# Patient Record
Sex: FEMALE | Race: WHITE
[De-identification: names, ages, dates, MRNs, and addresses within clinical notes are randomized per-mention and may not be internally consistent; named-entity substitution may affect disease eponyms.]

---

## 2018-06-28 ENCOUNTER — HOSPITAL ENCOUNTER (EMERGENCY)
Dept: HOSPITAL 76 - ED | Age: 58
Discharge: HOME | End: 2018-06-28
Payer: COMMERCIAL

## 2018-06-28 VITALS — DIASTOLIC BLOOD PRESSURE: 89 MMHG | SYSTOLIC BLOOD PRESSURE: 168 MMHG

## 2018-06-28 DIAGNOSIS — N20.0: Primary | ICD-10-CM

## 2018-06-28 DIAGNOSIS — Z79.82: ICD-10-CM

## 2018-06-28 DIAGNOSIS — Z87.442: ICD-10-CM

## 2018-06-28 LAB
ALBUMIN DIAFP-MCNC: 4.4 G/DL (ref 3.2–5.5)
ALBUMIN/GLOB SERPL: 1.1 {RATIO} (ref 1–2.2)
ALP SERPL-CCNC: 64 IU/L (ref 42–121)
ALT SERPL W P-5'-P-CCNC: 51 IU/L (ref 10–60)
ANION GAP SERPL CALCULATED.4IONS-SCNC: 8 MMOL/L (ref 6–13)
AST SERPL W P-5'-P-CCNC: 50 IU/L (ref 10–42)
BASOPHILS NFR BLD AUTO: 0 10^3/UL (ref 0–0.1)
BASOPHILS NFR BLD AUTO: 0.6 %
BILIRUB BLD-MCNC: 0.6 MG/DL (ref 0.2–1)
BUN SERPL-MCNC: 11 MG/DL (ref 6–20)
CALCIUM UR-MCNC: 10 MG/DL (ref 8.5–10.3)
CHLORIDE SERPL-SCNC: 99 MMOL/L (ref 101–111)
CLARITY UR REFRACT.AUTO: (no result)
CO2 SERPL-SCNC: 30 MMOL/L (ref 21–32)
CREAT SERPLBLD-SCNC: 0.7 MG/DL (ref 0.4–1)
EOSINOPHIL # BLD AUTO: 0.1 10^3/UL (ref 0–0.7)
EOSINOPHIL NFR BLD AUTO: 2 %
ERYTHROCYTE [DISTWIDTH] IN BLOOD BY AUTOMATED COUNT: 13.1 % (ref 12–15)
GFRSERPLBLD MDRD-ARVRAT: 87 ML/MIN/{1.73_M2} (ref 89–?)
GLOBULIN SER-MCNC: 3.9 G/DL (ref 2.1–4.2)
GLUCOSE SERPL-MCNC: 187 MG/DL (ref 70–100)
GLUCOSE UR QL STRIP.AUTO: (no result) MG/DL
HGB UR QL STRIP: 13.7 G/DL (ref 12–16)
KETONES UR QL STRIP.AUTO: NEGATIVE MG/DL
LIPASE SERPL-CCNC: 27 U/L (ref 22–51)
LYMPHOCYTES # SPEC AUTO: 2.7 10^3/UL (ref 1.5–3.5)
LYMPHOCYTES NFR BLD AUTO: 37.4 %
MCH RBC QN AUTO: 31.7 PG (ref 27–31)
MCHC RBC AUTO-ENTMCNC: 33.6 G/DL (ref 32–36)
MCV RBC AUTO: 94.4 FL (ref 81–99)
MONOCYTES # BLD AUTO: 0.4 10^3/UL (ref 0–1)
MONOCYTES NFR BLD AUTO: 5.2 %
NEUTROPHILS # BLD AUTO: 4 10^3/UL (ref 1.5–6.6)
NEUTROPHILS # SNV AUTO: 7.2 X10^3/UL (ref 4.8–10.8)
NEUTROPHILS NFR BLD AUTO: 54.8 %
NITRITE UR QL STRIP.AUTO: (no result)
PDW BLD AUTO: 7.6 FL (ref 7.9–10.8)
PH UR STRIP.AUTO: 7 PH (ref 5–7.5)
PLATELET # BLD: 365 10^3/UL (ref 130–450)
PROT SPEC-MCNC: 8.3 G/DL (ref 6.7–8.2)
PROT UR STRIP.AUTO-MCNC: 30 MG/DL
RBC # UR STRIP.AUTO: (no result) /UL
RBC # URNS HPF: (no result) /HPF (ref 0–5)
RBC MAR: 4.32 10^6/UL (ref 4.2–5.4)
SODIUM SERPLBLD-SCNC: 137 MMOL/L (ref 135–145)
SP GR UR STRIP.AUTO: 1.01 (ref 1–1.03)
SQUAMOUS URNS QL MICRO: (no result)
UROBILINOGEN UR QL STRIP.AUTO: (no result) E.U./DL
UROBILINOGEN UR STRIP.AUTO-MCNC: NEGATIVE MG/DL

## 2018-06-28 PROCEDURE — 85025 COMPLETE CBC W/AUTO DIFF WBC: CPT

## 2018-06-28 PROCEDURE — 99283 EMERGENCY DEPT VISIT LOW MDM: CPT

## 2018-06-28 PROCEDURE — 74177 CT ABD & PELVIS W/CONTRAST: CPT

## 2018-06-28 PROCEDURE — 83690 ASSAY OF LIPASE: CPT

## 2018-06-28 PROCEDURE — 96374 THER/PROPH/DIAG INJ IV PUSH: CPT

## 2018-06-28 PROCEDURE — 96372 THER/PROPH/DIAG INJ SC/IM: CPT

## 2018-06-28 PROCEDURE — 87086 URINE CULTURE/COLONY COUNT: CPT

## 2018-06-28 PROCEDURE — 81001 URINALYSIS AUTO W/SCOPE: CPT

## 2018-06-28 PROCEDURE — 99284 EMERGENCY DEPT VISIT MOD MDM: CPT

## 2018-06-28 PROCEDURE — 36415 COLL VENOUS BLD VENIPUNCTURE: CPT

## 2018-06-28 PROCEDURE — 81003 URINALYSIS AUTO W/O SCOPE: CPT

## 2018-06-28 PROCEDURE — 80053 COMPREHEN METABOLIC PANEL: CPT

## 2018-06-28 NOTE — ED PHYSICIAN DOCUMENTATION
PD HPI ABD PAIN





- Stated complaint


Stated Complaint: FEM 





- Chief complaint


Chief Complaint: Abd Pain





- History obtained from


History obtained from: Patient, Family





- History of Present Illness


Timing - onset: Today


Timing - duration: Days (1)


Timing - details: Gradual onset


Pain level max: 8


Pain level now: 8


Quality: Aching, Dull, Pain


Location: Other (R low pelvic)


Radiation: Other (non-radiating)


Improved by: Other (nothing, hasn't taken any medications for this)


Worsened by: Other (nothing)


Associated symptoms: No: Fever, Nausea, Vomiting, Hematemesis, Diarrhea, 

Constipation, Melena, Hematochezia, Dysuria, Hematuria, Vaginal bleeding, 

Vaginal dc


Similar symptoms before: Diagnosis (kidney stones x 5 in the past.  Has had 

lithotripsy.)


Recently seen: Not recently seen





Review of Systems


Ten Systems: 10 systems reviewed and negative


Constitutional: denies: Fever, Chills


Ears: denies: Ear pain


Nose: denies: Rhinorrhea / runny nose, Congestion


Throat: denies: Sore throat


Cardiac: denies: Chest pain / pressure


Respiratory: denies: Cough


GI: denies: Vomiting, Diarrhea


: denies: Dysuria, Frequency, Hesitancy


Skin: denies: Rash


Musculoskeletal: denies: Neck pain, Back pain


Neurologic: denies: Focal weakness, Numbness





PD PAST MEDICAL HISTORY





- Past Medical History


Past Medical History: Yes


: Kidney stones





- Past Surgical History


Past Surgical History: No





- Present Medications


Home Medications: 


 Ambulatory Orders











 Medication  Instructions  Recorded  Confirmed


 


Aspirin [Children's Aspirin] 81 mg PO 06/28/18 


 


Carvedilol 6.25 mg PO 06/28/18 


 


Celecoxib 200 mg PO 06/28/18 


 


Ibuprofen [Motrin] 800 mg PO Q8H PRN #30 tablet 06/28/18 


 


Insulin Glargine [Lantus Solostar] 70 unit DAILY 06/28/18 06/28/18


 


Ondansetron Odt [Zofran] 4 mg TL Q6H PRN #10 tablet 06/28/18 


 


Oxycodone HCl/Acetaminophen 1 - 2 each PO Q6H PRN #14 tablet 06/28/18 





[Percocet 5-325 mg Tablet]   


 


Simvastatin 20 mg PO 06/28/18 


 


Telmisartan [Micardis] 40 mg PO 06/28/18 














- Allergies


Allergies/Adverse Reactions: 


 Allergies











Allergy/AdvReac Type Severity Reaction Status Date / Time


 


No Known Drug Allergies Allergy   Verified 06/28/18 16:43














- Social History


Does the pt smoke?: No


Smoking Status: Never smoker


Does the pt drink ETOH?: No


Does the pt have substance abuse?: No





- Immunizations


Immunizations are current?: Yes





- POLST


Patient has POLST: No





PD ED PE NORMAL





- Vitals


Vital signs reviewed: Yes





- General


General: Alert and oriented X 3, No acute distress, Well developed/nourished





- HEENT


HEENT: Moist mucous membranes





- Neck


Neck: Supple, no meningeal sign





- Cardiac


Cardiac: RRR





- Respiratory


Respiratory: No respiratory distress, Clear bilaterally





- Abdomen


Abdomen: Normal bowel sounds, Soft, Non tender, Non distended





- Back


Back: No CVA TTP, No spinal TTP





- Derm


Derm: Warm and dry, No rash





- Neuro


Neuro: Alert and oriented X 3





- Psych


Psych: Normal mood, Normal affect





Results





- Vitals


Vitals: 


 Oxygen











O2 Source                      Room air

















- Labs


Labs: 


 Laboratory Tests











  06/28/18 06/28/18 06/28/18





  16:55 17:00 17:00


 


WBC   7.2 


 


RBC   4.32 


 


Hgb   13.7 


 


Hct   40.8 


 


MCV   94.4 


 


MCH   31.7 H 


 


MCHC   33.6 


 


RDW   13.1 


 


Plt Count   365 


 


MPV   7.6 L 


 


Neut # (Auto)   4.0 


 


Lymph # (Auto)   2.7 


 


Mono # (Auto)   0.4 


 


Eos # (Auto)   0.1 


 


Baso # (Auto)   0.0 


 


Absolute Nucleated RBC   0.00 


 


Nucleated RBC %   0.1 


 


Sodium    137


 


Potassium    3.9


 


Chloride    99 L


 


Carbon Dioxide    30


 


Anion Gap    8.0


 


BUN    11


 


Creatinine    0.7


 


Estimated GFR (MDRD)    87 L


 


Glucose    187 H


 


Calcium    10.0


 


Total Bilirubin    0.6


 


AST    50 H


 


ALT    51


 


Alkaline Phosphatase    64


 


Total Protein    8.3 H


 


Albumin    4.4


 


Globulin    3.9


 


Albumin/Globulin Ratio    1.1


 


Lipase    27


 


Urine Color  ORANGE  


 


Urine Clarity  HAZY  


 


Urine pH  7.0  


 


Ur Specific Gravity  1.010  


 


Urine Protein  30 H  


 


Urine Glucose (UA)  ***  


 


Urine Ketones  NEGATIVE  


 


Urine Occult Blood  LARGE H  


 


Urine Nitrite  ***  


 


Urine Bilirubin  NEGATIVE  


 


Urine Urobilinogen  ***  


 


Ur Leukocyte Esterase  NEGATIVE  


 


Urine RBC  TNTC H  


 


Urine WBC  0-3  


 


Ur Squamous Epith Cells  NONE SEEN  


 


Urine Bacteria  None Seen  


 


Ur Microscopic Review  INDICATED  


 


Urine Culture Comments  NOT INDICATED  














- Rads (name of study)


  ** CT abd.[email or web address]


Radiology: Prelim report reviewed, EMP read contemporaneously, See rad report (

4 mm stone at the right ureterovesicular junction causing mild distal right 

hydroureter.  No significant right hydronephrosis)





PD MEDICAL DECISION MAKING





- ED course


Complexity details: reviewed results, re-evaluated patient, considered 

differential, d/w patient


ED course: 





Patient is a 57-year-old female with a right distal ureteral stone, 4 mm.  

Should be able to pass.  No evidence of infected stone at this time.  Pain well 

controlled.  Will prescribe pain medication for home.  We will also prescribe 

nausea medication for home.  Patient counseled regarding signs and symptoms for 

which I believe and urgent re-evaluation would be necessary. Patient with good 

understanding of and agreement to plan and is comfortable going home at this 

time





This document was made in part using voice recognition software. While efforts 

are made to proofread this document, sound alike and grammatical errors may 

occur.





- Sepsis Event


Vital Signs: 


 Oxygen











O2 Source                      Room air

















Departure





- Departure


Disposition: 01 Home, Self Care


Clinical Impression: 


 Renal colic on right side





Condition: Good


Instructions:  ED Stone Renal W Colic


Follow-Up: 


your,doctor in 1 week [Other]


Prescriptions: 


Ibuprofen [Motrin] 800 mg PO Q8H PRN #30 tablet


 PRN Reason: PAIN &/OR FEVER


Ondansetron Odt [Zofran] 4 mg TL Q6H PRN #10 tablet


 PRN Reason: Nausea / Vomiting


Oxycodone HCl/Acetaminophen [Percocet 5-325 mg Tablet] 1 - 2 each PO Q6H PRN #

14 tablet


 PRN Reason: pain


Comments: 


You do have a 4 mm stone at the right ureterovesicular junction.  This should 

pass.  There does not appear to be an infection associated with this at this 

time.  Return if you worsen.





Do not drink alcohol or drive while on narcotic pain medicine. 


Note that many narcotic pain relievers also contain tylenol/acetaminophen. 

Please ensure that your total dose of acetaminophen from all sources does not 

exceed 3 grams (3000mg) per day. 


You may constipated on this medication, take a stool softener such as "Colace" 

twice a day while you are on it. Also recommend a over-the-counter laxative 

such as senna or MiraLAX any day that you do not have a bowel movement. 


If you received narcotic pain medication in the emergency department, do not 

drive or operate machinery for the next 24 hours.





Discharge Date/Time: 06/28/18 20:34

## 2018-06-28 NOTE — CT REPORT
Procedure Date:  06/28/2018   

Accession Number:  [id number] / [id number]                    

Procedure:  CT  - Abdomen/Pelvis W/ CPT Code:  

 

FULL RESULT:

 

 

EXAM:

CT ABDOMEN AND PELVIS

 

EXAM DATE: 6/28/2018 07:53 PM.

 

CLINICAL HISTORY: R low pelvic pain.

 

COMPARISONS: None.

 

TECHNIQUE: Routine helical CT imaging was performed through the abdomen 

and pelvis. IV contrast: ISOVUE 300  100mL. Enteric contrast: No. 

Reconstructions: Coronal and sagittal.

 

In accordance with CT protocol optimization, one or more of the following 

dose reduction techniques were utilized for this exam: automated exposure 

control, adjustment of mA and/or KV based on patient size, or use of 

iterative reconstructive technique.

 

FINDINGS:

Lung Bases: Unremarkable.

 

Liver: Mildly enlarged. No focal lesion.

 

Gallbladder/Bile Ducts: Unremarkable.

 

Spleen: Calcification and mild low attenuation in the anterior spleen 

likely mild scarring.

 

Pancreas: Atrophic.

 

Adrenal Glands: Normal.

 

Kidneys: 4 mm stone at the right ureterovesical junction. Mild distal 

right hydroureter. No significant right hydronephrosis. Symmetric renal 

enhancement. No left hydronephrosis or left-sided stones.

 

Peritoneal Cavity/Bowel: No free fluid, free air or adenopathy. There are 

a few colonic diverticula. No masses or acute inflammatory process. The 

appendix is well visualized and normal.

 

Pelvic Organs: Urinary bladder is mostly decompressed. No wall 

thickening. Uterus is absent.

 

Vasculature: No aneurysms or other significant abnormality.

 

Bones: No significant abnormality.

 

Other: Soma gas in the left gluteal fat is likely injection.

IMPRESSION: 4 mm stone at the right ureterovesical junction causing mild 

distal right hydroureter. No significant right hydronephrosis.

 

[facility]